# Patient Record
Sex: FEMALE | Race: WHITE | NOT HISPANIC OR LATINO | Employment: OTHER | URBAN - METROPOLITAN AREA
[De-identification: names, ages, dates, MRNs, and addresses within clinical notes are randomized per-mention and may not be internally consistent; named-entity substitution may affect disease eponyms.]

---

## 2017-01-03 ENCOUNTER — TELEPHONE (OUTPATIENT)
Dept: CARDIOLOGY | Facility: CLINIC | Age: 77
End: 2017-01-03

## 2017-01-03 NOTE — TELEPHONE ENCOUNTER
The patient wore a 14 day Zio patch monitor from 12/9/2016-12/28/2016.  This showed normal sinus rhythm with average heart rate of 74 beats per minutes, range 52 to-148 bpm.  The patient had rare premature atrial contractions.  There were 5 very brief runs of supraventricular tachycardia ranging from 4 beats to 15 beats in length.  The longest was 15 beats in duration with a rate of 106 beats per minutes and the fastest was 143 bpm at 7 beats in length.  The SVT were felt to be clinically insignificant.  She was noted to have rare PVCs.  There was no evidence of atrial fibrillation.  The patient did report palpitations, dizziness, and fainting during the monitoring period.    Dr. Lock - please review the results and your recent office note. Please advise. Thanks.

## 2017-01-03 NOTE — TELEPHONE ENCOUNTER
Kellie,    I actually already talked to her on the phone.  I don't think she needs any further cardiac work-up at this point.  Thanks    Otis

## 2017-01-03 NOTE — TELEPHONE ENCOUNTER
----- Message from COURTNEY Cordero sent at 12/13/2016  3:27 PM EST -----    Follow up on 14 day zio patch

## 2018-11-06 ENCOUNTER — HOSPITAL ENCOUNTER (OUTPATIENT)
Dept: OTHER | Facility: HOSPITAL | Age: 78
Setting detail: SPECIMEN
Discharge: HOME | End: 2018-11-06
Attending: PODIATRIST | Admitting: PODIATRIST